# Patient Record
Sex: FEMALE
[De-identification: names, ages, dates, MRNs, and addresses within clinical notes are randomized per-mention and may not be internally consistent; named-entity substitution may affect disease eponyms.]

---

## 2021-11-23 PROBLEM — Z00.00 ENCOUNTER FOR PREVENTIVE HEALTH EXAMINATION: Status: ACTIVE | Noted: 2021-11-23

## 2021-11-30 ENCOUNTER — APPOINTMENT (OUTPATIENT)
Dept: NEUROSURGERY | Facility: CLINIC | Age: 31
End: 2021-11-30

## 2021-11-30 ENCOUNTER — APPOINTMENT (OUTPATIENT)
Dept: NEUROSURGERY | Facility: CLINIC | Age: 31
End: 2021-11-30
Payer: SELF-PAY

## 2021-11-30 DIAGNOSIS — H93.A9 PULSATILE TINNITUS, UNSPECIFIED EAR: ICD-10-CM

## 2021-11-30 PROCEDURE — EDU01: CPT

## 2021-12-05 NOTE — ASSESSMENT
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 This is a sequel of chronic severe stenosis at the junction of the transverse and sigmoid venous sinuses. We discussed the natural history of intracranial venous aneurysms and I explained to the patient that these aneurysms DO NOT cause intracranial hemorrhage or stroke. \par \par The pulsatile tinnitus is severe and has a negative impact in social life, work and daily living. We discussed treatment options which include observation, trial of Diamox, endovascular treatment, open surgery.\par \par The patient would like to proceed with endovascular treatment. Treatment includes stent -assisted embolization of the wide-neck venous aneurysm of the proximal sigmoid venous sinus. We discussed with the patient my extensive personal experience with this treatment and the results of a recent clinical trial (Miles et al, Interventional Neuroradiology 2020). We discussed the procedure that has two components. The first part consists of catheter angiogram and manometry to assess the degree of stenosis and confirm the presence of the venous aneurysm. This part is typically performed with the patient awake. The second part consists of embolization of the venous aneurysm utilizing stent, coils or both and is performed under general anesthesia.\par \par The risks, benefits and alternatives of the procedure were discussed with the patient in detail. In my personal experience, the risks are very rare, but the possibility is not zero. Risks include stroke, brain hemorrhage, any type of disability (facial or extremity weakness, facial or extremity numbness, speech difficulties, blindness) and death. There are also possible complications related to the incisions such as infection, pain, swelling and bleeding.\par \par The patient is aware that the procedure requires dual antiplatelet therapy for 1-month post-stent (typically aspirin 325 mg daily and clopidogrel 75 mg daily) and antiplatelet monotherapy (typically aspirin 325 mg daily) for additional 11 months post-stent.\par \par \par PLAN\par Venous Sinus Stenting with Embolization of wide-neck venous aneurysm.\par Will need ASA 325mg and Plavix 75mg Daily for 5 days prior to the procedure\par If she decides not to proceed with stenting, she should have regular ophthalmology follow up\par

## 2021-12-05 NOTE — REASON FOR VISIT
[Home] : at home, [unfilled] , at the time of the visit. [Medical Office: ([facility])___] : at the medical office located in  [Verbal consent obtained from patient] : the patient, [unfilled] [New Patient Visit] : a new patient visit [FreeTextEntry1] : Pulsatile Tinnitus

## 2021-12-05 NOTE — HISTORY OF PRESENT ILLNESS
[de-identified] : I conducted a remote educational consult with Alysha Manley on 11/30/2021. I explained that I am only able to provide an educational consult and not render treatment as I am not licensed in the state in which [place] is located. A written informed consent for the educational consult was obtained and is included in the EHR. Alysha Manley is informed that there would be a $250 cost associated with the conduct of the remote educational consult. \par \par Ms. Manley is a pleasant 32 year old female who presents today with a chief complaint of RIGHT ear pulsatile tinnitus.  It first started 2019.  Since that time it has been getting progressively worse.  It is described as a constant, whooshing sound that is in sync with her pulse.  Pressing on the right side of her neck and turning her head to the right side improves the sound.  It is very bothersome to her.\par \par She describes head pressure that occurs through out the day, worse with straining and stress.\par \par When the pulsatile tinnitus first started she also was having TVOs.  She was seen by ophthalmology and did not have any papilledema.  The TVOs have subsided.\par \par She had an LP in 2020 with a normal opening pressure but states that the 5 days following her procedure were the only days that she did not have pulsatile tinnitus.  She did a trial of Diamox for 3 months which improved her headaches but it did not change her pulsatile tinnitus.

## 2021-12-05 NOTE — REVIEW OF SYSTEMS
[As Noted in HPI] : as noted in HPI [Negative] : Heme/Lymph [de-identified] : Headaches [FreeTextEntry4] : Pulsatile Tinnitus

## 2022-01-06 ENCOUNTER — APPOINTMENT (OUTPATIENT)
Dept: NEUROSURGERY | Facility: CLINIC | Age: 32
End: 2022-01-06

## 2022-01-18 ENCOUNTER — APPOINTMENT (OUTPATIENT)
Dept: NEUROSURGERY | Facility: CLINIC | Age: 32
End: 2022-01-18

## 2022-01-18 NOTE — REASON FOR VISIT
[Follow-Up: _____] : a [unfilled] follow-up visit [Home] : at home, [unfilled] , at the time of the visit. [Medical Office: ([facility])___] : at the medical office located in  [Verbal consent obtained from patient] : the patient, [unfilled]

## 2022-01-19 NOTE — HISTORY OF PRESENT ILLNESS
[de-identified] : Ms. Manley is a pleasant 32 year old female who presents today for follow up of RIGHT ear pulsatile tinnitus.  It first started 2019.  Since that time it has been getting progressively worse.  It is described as a constant, whooshing sound that is in sync with her pulse.  Pressing on the right side of her neck and turning her head to the right side improves the sound.  It is very bothersome to her.\par \par She describes head pressure that occurs through out the day, worse with straining and stress.\par \par When the pulsatile tinnitus first started she also was having TVOs.  She was seen by ophthalmology and did not have any papilledema.  The TVOs have subsided.  The ophthalmologist is on the telehealth visit today and confirms that she does not have papilledema.\par \par She had an LP in 2020 with a normal opening pressure but states that the 5 days following her procedure were the only days that she did not have pulsatile tinnitus.  She did a trial of Diamox for 3 months which improved her headaches but it did not change her pulsatile tinnitus.

## 2022-01-19 NOTE — ASSESSMENT
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 This is a sequel of chronic severe stenosis at the junction of the transverse and sigmoid venous sinuses. We discussed the natural history of intracranial venous aneurysms and I explained to the patient that these aneurysms DO NOT cause intracranial hemorrhage or stroke. \par \par The pulsatile tinnitus is severe and has a negative impact in social life, work and daily living. We discussed treatment options which include observation, trial of Diamox, endovascular treatment, open surgery.\par \par The patient would like to proceed with endovascular treatment. Treatment includes stent -assisted embolization of the wide-neck venous aneurysm of the proximal sigmoid venous sinus. We discussed with the patient my extensive personal experience with this treatment and the results of a recent clinical trial (Miles et al, Interventional Neuroradiology 2020). We discussed the procedure that has two components. The first part consists of catheter angiogram and manometry to assess the degree of stenosis and confirm the presence of the venous aneurysm. This part is typically performed with the patient awake. The second part consists of embolization of the venous aneurysm utilizing stent, coils or both and is performed under general anesthesia.\par \par The risks, benefits and alternatives of the procedure were discussed with the patient in detail. In my personal experience, the risks are very rare, but the possibility is not zero. Risks include stroke, brain hemorrhage, any type of disability (facial or extremity weakness, facial or extremity numbness, speech difficulties, blindness) and death. There are also possible complications related to the incisions such as infection, pain, swelling and bleeding.\par \par The patient is aware that the procedure requires dual antiplatelet therapy for 1-month post-stent (typically aspirin 325 mg daily and clopidogrel 75 mg daily) and antiplatelet monotherapy (typically aspirin 325 mg daily) for additional 11 months post-stent.\par \par \par PLAN\par Venous Sinus Stenting with Embolization of wide-neck venous aneurysm.\par Will need ASA 325mg and Plavix 75mg Daily for 5 days prior to the procedure\par If she decides not to proceed with stenting, she should have regular ophthalmology follow up\par

## 2022-01-19 NOTE — REVIEW OF SYSTEMS
[As Noted in HPI] : as noted in HPI [Negative] : Heme/Lymph [de-identified] : Headaches [FreeTextEntry4] : Pulsatile Tinnitus

## 2022-04-11 PROBLEM — H93.A9 PULSATILE TINNITUS: Status: ACTIVE | Noted: 2021-11-30
